# Patient Record
Sex: FEMALE | Race: OTHER | URBAN - METROPOLITAN AREA
[De-identification: names, ages, dates, MRNs, and addresses within clinical notes are randomized per-mention and may not be internally consistent; named-entity substitution may affect disease eponyms.]

---

## 2019-08-21 ENCOUNTER — OFFICE VISIT (OUTPATIENT)
Dept: PRIMARY CARE CLINIC | Age: 44
End: 2019-08-21

## 2019-08-21 VITALS
BODY MASS INDEX: 26.27 KG/M2 | WEIGHT: 167.4 LBS | SYSTOLIC BLOOD PRESSURE: 136 MMHG | DIASTOLIC BLOOD PRESSURE: 90 MMHG | RESPIRATION RATE: 18 BRPM | TEMPERATURE: 97.8 F | OXYGEN SATURATION: 100 % | HEIGHT: 67 IN | HEART RATE: 54 BPM

## 2019-08-21 DIAGNOSIS — F41.9 ANXIETY: Primary | ICD-10-CM

## 2019-08-21 DIAGNOSIS — M21.612 BUNION, LEFT FOOT: ICD-10-CM

## 2019-08-21 DIAGNOSIS — R06.02 SOB (SHORTNESS OF BREATH): ICD-10-CM

## 2019-08-21 DIAGNOSIS — R00.2 PALPITATIONS: ICD-10-CM

## 2019-08-21 DIAGNOSIS — F43.21 GRIEF: ICD-10-CM

## 2019-08-21 RX ORDER — BISMUTH SUBSALICYLATE 262 MG
1 TABLET,CHEWABLE ORAL DAILY
COMMUNITY

## 2019-08-21 RX ORDER — ALPRAZOLAM 0.5 MG/1
0.25 TABLET ORAL
COMMUNITY
End: 2019-10-02 | Stop reason: CLARIF

## 2019-08-21 RX ORDER — ALBUTEROL SULFATE 90 UG/1
2 AEROSOL, METERED RESPIRATORY (INHALATION)
Refills: 0 | COMMUNITY
Start: 2019-08-16

## 2019-08-21 RX ORDER — ESCITALOPRAM OXALATE 10 MG/1
10 TABLET ORAL
Qty: 30 TAB | Refills: 3 | Status: SHIPPED | OUTPATIENT
Start: 2019-08-21 | End: 2019-10-02 | Stop reason: ALTCHOICE

## 2019-08-21 NOTE — PROGRESS NOTES
Brandon Weaver is a 44 y.o.  female and presents with     Chief Complaint   Patient presents with    Establish Care    Shortness of Breath    Palpitations     [facility]     Pt is here to establish care. Pt has been having symptoms of SOB off and on for 2 years. Pt says he previous PCP mentioned it was stress. Pt was prescribed inhaler. Pt says she drinks non caffienated beverage. Pt says she wonders it could be anxiety. She gets palpiations. [facility] and [facility] was normal.  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 Her frined  Passed away few months back. Mom passed away when she was 16years old. Pt does not have children. Pts  is leaving for work on September 12 th. Pt does not feel depressed but feels tearfull. Pt takes xanax prn. NO h/o DVT , PE. No h/o oral contraceptive. Pt has never been pregmant. Pt has mutation for ovarian cancer and OBgyn is monitoring. Pt also has bunion over left foot . Past Medical History:   Diagnosis Date    Anxiety      Past Surgical History:   Procedure Laterality Date    HX WISDOM TEETH EXTRACTION       Current Outpatient Medications   Medication Sig    ALPRAZolam (XANAX) 0.5 mg tablet Take 0.25 mg by mouth nightly as needed for Anxiety.  VENTOLIN HFA 90 mcg/actuation inhaler Take 2 Puffs by inhalation every four (4) hours as needed.  multivitamin (ONE A DAY) tablet Take 1 Tab by mouth daily.  BIOTIN PO Take  by mouth daily.  escitalopram oxalate (LEXAPRO) 10 mg tablet Take 1 Tab by mouth nightly. No current facility-administered medications for this visit. Health Maintenance   Topic Date Due    BREAST CANCER SCRN MAMMOGRAM  12/30/1994    DTaP/Tdap/Td series (1 - Tdap) 12/30/1997    PAP AKA CERVICAL CYTOLOGY  12/30/1997    Influenza Age 5 to Adult  08/01/2019    Pneumococcal 0-64 years  Aged Out       There is no immunization history on file for this patient.   Patient's last menstrual period was 08/02/2019. Allergies and Intolerances:   No Known Allergies    Family History:   No family history on file. Social History:   She  reports that she quit smoking about 5 months ago. She has never used smokeless tobacco.  She  reports that she drinks alcohol. Review of Systems:   General: negative for - chills, fatigue, fever, weight change  Psych: negative for - anxiety, depression, irritability or mood swings  ENT: negative for - headaches, hearing change, nasal congestion, oral lesions, sneezing or sore throat  Heme/ Lymph: negative for - bleeding problems, bruising, pallor or swollen lymph nodes  Endo: negative for - hot flashes, polydipsia/polyuria or temperature intolerance  Resp: negative for - cough, shortness of breath or wheezing  CV: negative for - chest pain, edema or palpitations  GI: negative for - abdominal pain, change in bowel habits, constipation, diarrhea or nausea/vomiting  : negative for - dysuria, hematuria, incontinence, pelvic pain or vulvar/vaginal symptoms  MSK: negative for - joint pain, joint swelling or muscle pain  Neuro: negative for - confusion, headaches, seizures or weakness  Derm: negative for - dry skin, hair changes, rash or skin lesion changes          Physical:   Vitals:   Vitals:    08/21/19 0805   BP: 136/90   Pulse: (!) 54   Resp: 18   Temp: 97.8 °F (36.6 °C)   TempSrc: Oral   SpO2: 100%   Weight: 167 lb 6.4 oz (75.9 kg)   Height: 5' 6.5\" (1.689 m)           Exam:   HEENT- atraumatic,normocephalic, awake, oriented, well nourished  Neck - supple,no enlarged lymph nodes, no JVD, no thyromegaly  Chest- CTA, no rhonchi, no crackles  Heart- rrr, no murmurs / gallop/rub  Abdomen- soft,BS+,NT, no hepatosplenomegaly  Ext - no c/c/edema   Neuro- no focal deficits. Power 5/5 all extremities  Skin - warm,dry, no obvious rashes.           Review of Data:   LABS:   No results found for: WBC, HGB, HCT, PLT, HGBEXT, HCTEXT, PLTEXT, HGBEXT, HCTEXT, PLTEXT  No results found for: NA, K, CL, CO2, GLU, BUN, CREA  No results found for: CHOL, CHOLX, CHLST, CHOLV, HDL, LDL, LDLC, DLDLP, TGLX, TRIGL, TRIGP  No results found for: GPT        Impression / Plan:        ICD-10-CM ICD-9-CM    1. Anxiety F41.9 300.00 escitalopram oxalate (LEXAPRO) 10 mg tablet      CBC WITH AUTOMATED DIFF      METABOLIC PANEL, COMPREHENSIVE      TSH 3RD GENERATION   2. Palpitations R00.2 785.1    3. SOB (shortness of breath) R06.02 786.05    4. Grief F43.21 309.0    5. Bunion, left foot M21.612 727.1 REFERRAL TO PODIATRY       Explained to patient risk benefits of the medications. Advised patient to stop meds if having any side effects. Pt verbalized understanding of the instructions. I have discussed the diagnosis with the patient and the intended plan as seen in the above orders. The patient has received an after-visit summary and questions were answered concerning future plans. I have discussed medication side effects and warnings with the patient as well. I have reviewed the plan of care with the patient, accepted their input and they are in agreement with the treatment goals. Reviewed plan of care. Patient has provided input and agrees with goals. Follow-up and Dispositions    · Return in about 6 weeks (around 10/2/2019).          Kuldip Logan MD

## 2019-08-22 ENCOUNTER — TELEPHONE (OUTPATIENT)
Dept: PRIMARY CARE CLINIC | Age: 44
End: 2019-08-22

## 2019-08-22 DIAGNOSIS — D75.89 MACROCYTOSIS: Primary | ICD-10-CM

## 2019-08-22 DIAGNOSIS — D75.89 MACROCYTOSIS: ICD-10-CM

## 2019-08-22 LAB
ALBUMIN SERPL-MCNC: 4.5 G/DL (ref 3.5–5.5)
ALBUMIN/GLOB SERPL: 2 {RATIO} (ref 1.2–2.2)
ALP SERPL-CCNC: 36 IU/L (ref 39–117)
ALT SERPL-CCNC: 15 IU/L (ref 0–32)
AST SERPL-CCNC: 17 IU/L (ref 0–40)
BASOPHILS # BLD AUTO: 0 X10E3/UL (ref 0–0.2)
BASOPHILS NFR BLD AUTO: 1 %
BILIRUB SERPL-MCNC: 0.3 MG/DL (ref 0–1.2)
BUN SERPL-MCNC: 11 MG/DL (ref 6–24)
BUN/CREAT SERPL: 15 (ref 9–23)
CALCIUM SERPL-MCNC: 9.5 MG/DL (ref 8.7–10.2)
CHLORIDE SERPL-SCNC: 101 MMOL/L (ref 96–106)
CO2 SERPL-SCNC: 25 MMOL/L (ref 20–29)
CREAT SERPL-MCNC: 0.73 MG/DL (ref 0.57–1)
EOSINOPHIL # BLD AUTO: 0.1 X10E3/UL (ref 0–0.4)
EOSINOPHIL NFR BLD AUTO: 1 %
ERYTHROCYTE [DISTWIDTH] IN BLOOD BY AUTOMATED COUNT: 12.7 % (ref 12.3–15.4)
GLOBULIN SER CALC-MCNC: 2.3 G/DL (ref 1.5–4.5)
GLUCOSE SERPL-MCNC: 80 MG/DL (ref 65–99)
HCT VFR BLD AUTO: 42.1 % (ref 34–46.6)
HGB BLD-MCNC: 13 G/DL (ref 11.1–15.9)
IMM GRANULOCYTES # BLD AUTO: 0 X10E3/UL (ref 0–0.1)
IMM GRANULOCYTES NFR BLD AUTO: 0 %
LYMPHOCYTES # BLD AUTO: 2 X10E3/UL (ref 0.7–3.1)
LYMPHOCYTES NFR BLD AUTO: 36 %
MCH RBC QN AUTO: 33.2 PG (ref 26.6–33)
MCHC RBC AUTO-ENTMCNC: 30.9 G/DL (ref 31.5–35.7)
MCV RBC AUTO: 108 FL (ref 79–97)
MONOCYTES # BLD AUTO: 0.6 X10E3/UL (ref 0.1–0.9)
MONOCYTES NFR BLD AUTO: 11 %
NEUTROPHILS # BLD AUTO: 2.8 X10E3/UL (ref 1.4–7)
NEUTROPHILS NFR BLD AUTO: 51 %
PLATELET # BLD AUTO: 221 X10E3/UL (ref 150–450)
POTASSIUM SERPL-SCNC: 4.3 MMOL/L (ref 3.5–5.2)
PROT SERPL-MCNC: 6.8 G/DL (ref 6–8.5)
RBC # BLD AUTO: 3.91 X10E6/UL (ref 3.77–5.28)
SODIUM SERPL-SCNC: 141 MMOL/L (ref 134–144)
TSH SERPL DL<=0.005 MIU/L-ACNC: 1.35 UIU/ML (ref 0.45–4.5)
WBC # BLD AUTO: 5.5 X10E3/UL (ref 3.4–10.8)

## 2019-08-22 NOTE — TELEPHONE ENCOUNTER
----- Message from Gina Copeland MD sent at 8/22/2019 12:37 PM EDT -----  Labs reveal large red cells.  I have ordered X24 and folic acid levels

## 2019-08-24 LAB
FOLATE SERPL-MCNC: >20 NG/ML
SPECIMEN STATUS REPORT, ROLRST: NORMAL
VIT B12 SERPL-MCNC: 345 PG/ML (ref 232–1245)

## 2019-09-09 ENCOUNTER — TELEPHONE (OUTPATIENT)
Dept: PRIMARY CARE CLINIC | Age: 44
End: 2019-09-09

## 2019-09-24 ENCOUNTER — TELEPHONE (OUTPATIENT)
Dept: PRIMARY CARE CLINIC | Age: 44
End: 2019-09-24

## 2019-09-24 NOTE — TELEPHONE ENCOUNTER
Pt states she has only been taking half since she started the medication. Pt would like to know if she should cut those in half then stop. Please advise.

## 2019-09-24 NOTE — TELEPHONE ENCOUNTER
Spoke with patient and let her know it is okay to stop taking lexapro completely since she has only been doing 1/2 tab. She verbalized her understanding and thanked \me.

## 2019-10-02 ENCOUNTER — OFFICE VISIT (OUTPATIENT)
Dept: PRIMARY CARE CLINIC | Age: 44
End: 2019-10-02

## 2019-10-02 VITALS
TEMPERATURE: 97.8 F | WEIGHT: 162.6 LBS | HEART RATE: 83 BPM | OXYGEN SATURATION: 97 % | RESPIRATION RATE: 16 BRPM | BODY MASS INDEX: 25.52 KG/M2 | DIASTOLIC BLOOD PRESSURE: 92 MMHG | HEIGHT: 67 IN | SYSTOLIC BLOOD PRESSURE: 132 MMHG

## 2019-10-02 DIAGNOSIS — F41.9 ANXIETY: Primary | ICD-10-CM

## 2019-10-02 RX ORDER — ALPRAZOLAM 0.25 MG/1
0.25 TABLET ORAL
Qty: 20 TAB | Refills: 0 | Status: SHIPPED | OUTPATIENT
Start: 2019-10-02

## 2019-10-02 RX ORDER — BUSPIRONE HYDROCHLORIDE 5 MG/1
5 TABLET ORAL 2 TIMES DAILY
Qty: 60 TAB | Refills: 3 | Status: SHIPPED | OUTPATIENT
Start: 2019-10-02

## 2019-10-02 NOTE — PROGRESS NOTES
HPI:       Nicci Oliver is a 44 y.o.  female and presents for routine annual wellness visit    Chief Complaint   Patient presents with    Other     anxiety follow up    Anxiety    Depression    Hypertension     Pt is here for follow up on labs. Pt says she took Lexapro and felt horrible. She does not want to try any anti anxiety meds. She wants to wait on HTN meds. She denies any chest pain, SOB,  She thinks BP is high due to  Anxiety. She says she tales xanax only 2-3 times a month. Past Medical History:   Diagnosis Date    Anxiety      Past Surgical History:   Procedure Laterality Date    HX WISDOM TEETH EXTRACTION       Current Outpatient Medications   Medication Sig    ALPRAZolam (XANAX) 0.25 mg tablet Take 1 Tab by mouth nightly as needed for Anxiety. Max Daily Amount: 0.25 mg.    busPIRone (BUSPAR) 5 mg tablet Take 1 Tab by mouth two (2) times a day.  VENTOLIN HFA 90 mcg/actuation inhaler Take 2 Puffs by inhalation every four (4) hours as needed.  multivitamin (ONE A DAY) tablet Take 1 Tab by mouth daily.  BIOTIN PO Take  by mouth daily. No current facility-administered medications for this visit. Health Maintenance   Topic Date Due    BREAST CANCER SCRN MAMMOGRAM  12/30/1994    DTaP/Tdap/Td series (1 - Tdap) 12/30/1997    PAP AKA CERVICAL CYTOLOGY  12/30/1997    Influenza Age 5 to Adult  08/01/2019    Pneumococcal 0-64 years  Aged Out       There is no immunization history on file for this patient. Patient's last menstrual period was 09/22/2019. Last MMG  Last PAP    Pneumovax  Shingles  Flu shot  TDAP      Allergies and Intolerances: Allergies   Allergen Reactions    Lexapro [Escitalopram Oxalate] Other (comments)     FELT DISCONNECTED       Family History:   No family history on file. Social History:   She  reports that she quit smoking about 6 months ago. She has never used smokeless tobacco.  She  reports that she drinks alcohol.             Review of Systems:   General: negative for - chills, fatigue, fever, weight change  Psych: negative for - anxiety, depression, irritability or mood swings  ENT: negative for - headaches, hearing change, nasal congestion, oral lesions, sneezing or sore throat  Heme/ Lymph: negative for - bleeding problems, bruising, pallor or swollen lymph nodes  Endo: negative for - hot flashes, polydipsia/polyuria or temperature intolerance  Resp: negative for - cough, shortness of breath or wheezing  CV: negative for - chest pain, edema or palpitations  GI: negative for - abdominal pain, change in bowel habits, constipation, diarrhea or nausea/vomiting  : negative for - dysuria, hematuria, incontinence, pelvic pain or vulvar/vaginal symptoms  MSK: negative for - joint pain, joint swelling or muscle pain  Neuro: negative for - confusion, headaches, seizures or weakness  Derm: negative for - dry skin, hair changes, rash or skin lesion changes          Physical:   Vitals:   Vitals:    10/02/19 1124   BP: (!) 132/92   Pulse: 83   Resp: 16   Temp: 97.8 °F (36.6 °C)   TempSrc: Oral   SpO2: 97%   Weight: 162 lb 9.6 oz (73.8 kg)   Height: 5' 6.5\" (1.689 m)           Exam:   HEENT- AT,NC,SEVEN  Neck - supple  Chest- CTA  Heart- rrr  Abdomen- soft,BS+,NT  Ext - no c/c/edema  Neuro- no focal deficits.           Review of Data:   LABS:   Lab Results   Component Value Date/Time    WBC 5.5 08/21/2019 08:43 AM    HGB 13.0 08/21/2019 08:43 AM    HCT 42.1 08/21/2019 08:43 AM    PLATELET 006 04/16/0067 08:43 AM     Lab Results   Component Value Date/Time    Sodium 141 08/21/2019 08:43 AM    Potassium 4.3 08/21/2019 08:43 AM    Chloride 101 08/21/2019 08:43 AM    CO2 25 08/21/2019 08:43 AM    Glucose 80 08/21/2019 08:43 AM    BUN 11 08/21/2019 08:43 AM    Creatinine 0.73 08/21/2019 08:43 AM     No results found for: CHOL, CHOLX, CHLST, CHOLV, HDL, HDLP, LDL, LDLC, DLDLP, TGLX, TRIGL, TRIGP  No results found for: GPT        Impression / Plan: ICD-10-CM ICD-9-CM    1. Anxiety F41.9 300.00 ALPRAZolam (XANAX) 0.25 mg tablet      busPIRone (BUSPAR) 5 mg tablet         Advised - regular monthly self breast exam, regular exercise. HTN - low salt diet, monitor BP. Explained to patient risk benefits of the medications. Advised patient to stop meds if having any side effects. Pt verbalized understanding of the instructions. I have discussed the diagnosis with the patient and the intended plan as seen in the above orders. The patient has received an after-visit summary and questions were answered concerning future plans. I have discussed medication side effects and warnings with the patient as well. I have reviewed the plan of care with the patient, accepted their input and they are in agreement with the treatment goals. Reviewed plan of care. Patient has provided input and agrees with goals.         Savage Pizarro MD

## 2019-10-02 NOTE — PROGRESS NOTES
Chief Complaint   Patient presents with    Other     anxiety follow up     1. Have you been to the ER, urgent care clinic since your last visit? Hospitalized since your last visit? No    2. Have you seen or consulted any other health care providers outside of the [address] [place] [address] since your last visit? Include any pap smears or colon screening.  No

## 2023-05-23 RX ORDER — BUSPIRONE HYDROCHLORIDE 5 MG/1
5 TABLET ORAL 2 TIMES DAILY
COMMUNITY
Start: 2019-10-02

## 2023-05-23 RX ORDER — ALBUTEROL SULFATE 90 UG/1
2 AEROSOL, METERED RESPIRATORY (INHALATION) EVERY 4 HOURS PRN
COMMUNITY
Start: 2019-08-16

## 2023-05-23 RX ORDER — ALPRAZOLAM 0.25 MG/1
0.25 TABLET ORAL
COMMUNITY
Start: 2019-10-02